# Patient Record
Sex: FEMALE | URBAN - METROPOLITAN AREA
[De-identification: names, ages, dates, MRNs, and addresses within clinical notes are randomized per-mention and may not be internally consistent; named-entity substitution may affect disease eponyms.]

---

## 2020-12-12 ENCOUNTER — NURSE TRIAGE (OUTPATIENT)
Dept: NURSING | Facility: CLINIC | Age: 75
End: 2020-12-12

## 2020-12-12 NOTE — TELEPHONE ENCOUNTER
Patient gave permission to speak with her daughter.    Patient woke up complaining of pain in leg.  Ran an errand to [employer] and walked on it just fine.  After got home, now is unable to walk on it.  Whenever she stands rates the pain 10/10.   Lower leg between knee and ankle.  Has tried standing on it a few times and then goes back to bed because she is reporting that it hurts.      No redness or swelling on her leg.  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     Advised that she be seen in the ER and to call 911 if unable to get her to the car safely.    Karolyn Do RN on 12/12/2020 at 3:34 PM      Reason for Disposition    Unable to walk    Additional Information    Negative: Looks like a broken bone or dislocated joint (e.g., crooked or deformed)    Negative: Sounds like a life-threatening emergency to the triager    Negative: Followed a leg injury    Negative: Leg swelling is main symptom    Negative: Back pain radiating (shooting) into leg(s)    Negative: Knee pain is main symptom    Negative: Ankle pain is main symptom    Negative: Pregnant    Negative: Postpartum (from 0 to 6 weeks after delivery)    Negative: Chest pain    Negative: Difficulty breathing    Negative: Entire foot is cool or blue in comparison to other side    Protocols used: LEG PAIN-A-AH